# Patient Record
Sex: FEMALE | Race: WHITE | NOT HISPANIC OR LATINO | Employment: FULL TIME | URBAN - METROPOLITAN AREA
[De-identification: names, ages, dates, MRNs, and addresses within clinical notes are randomized per-mention and may not be internally consistent; named-entity substitution may affect disease eponyms.]

---
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
## 2023-10-11 ENCOUNTER — TELEPHONE (OUTPATIENT)
Dept: OBSTETRICS AND GYNECOLOGY | Facility: CLINIC | Age: 23
End: 2023-10-11

## 2023-10-11 NOTE — TELEPHONE ENCOUNTER
Provider: DR. MCCLURE    Caller: LILIA VIEYRA    Relationship to Patient: SELF    Pharmacy:     Phone Number: [phone]     Reason for Call: CYST    When was the patient last seen: NEW GYN    Where is it located: INSIDE THE VAGINA    PATIENT IS SCHEDULE FOR A NEW GYN APPT FOR - PATIENT CAN FEEL CYST OR SOMETHING INSIDE HER VAGINA, PRESSURE IS ASSOCIATED WITH IT, ON 12.07.23 @ 2:15 PM WITH DR. MCCLURE.      PATIENT CAN BE REACHED AT  [phone]     PATIENT HAS [facility] BETTER [employer] INSURANCE AND HAS BEEN PLACED ON THE WAITING LIST    THANK YOU

## (undated) DEVICE — GOWN PROC ENDOARMOR GI LVL3 HY/SHLD UNIV

## (undated) DEVICE — KT ORCA ORCAPOD DISP STRL

## (undated) DEVICE — Device

## (undated) DEVICE — FLEX ADVANTAGE 1500CC: Brand: FLEX ADVANTAGE

## (undated) DEVICE — CANN NASL CO2 TRULINK W/O2 A/